# Patient Record
Sex: FEMALE | Race: WHITE | Employment: OTHER | URBAN - METROPOLITAN AREA
[De-identification: names, ages, dates, MRNs, and addresses within clinical notes are randomized per-mention and may not be internally consistent; named-entity substitution may affect disease eponyms.]

---

## 2019-06-04 NOTE — PATIENT DISCUSSION
Discussed condition and exacerbating conditions/situations (e.g., dry/arid environments, overhead fans, air conditioners, side effect of medications). Lot #: [id number] Lot #: [id number]

## 2020-11-19 ENCOUNTER — SURGERY/PROCEDURE (OUTPATIENT)
Dept: URBAN - METROPOLITAN AREA SURGERY 14 | Facility: SURGERY | Age: 75
End: 2020-11-19

## 2020-11-19 ENCOUNTER — YAG EVALUATION (OUTPATIENT)
Dept: URBAN - METROPOLITAN AREA CLINIC 39 | Facility: CLINIC | Age: 75
End: 2020-11-19

## 2020-11-19 DIAGNOSIS — H26.491: ICD-10-CM

## 2020-11-19 PROCEDURE — 92004 COMPRE OPH EXAM NEW PT 1/>: CPT

## 2020-11-19 PROCEDURE — 6682154 YAG CAPSULOTOMY(SX ONLY)

## 2020-11-19 ASSESSMENT — TONOMETRY: OD_IOP_MMHG: 16

## 2020-11-19 ASSESSMENT — VISUAL ACUITY
OD_CC: 20/50+2
OD_SC: 20/60

## 2023-08-08 ENCOUNTER — COMPREHENSIVE EXAM (OUTPATIENT)
Dept: URBAN - METROPOLITAN AREA CLINIC 37 | Facility: CLINIC | Age: 78
End: 2023-08-08

## 2023-08-08 DIAGNOSIS — H52.03: ICD-10-CM

## 2023-08-08 DIAGNOSIS — H43.393: ICD-10-CM

## 2023-08-08 DIAGNOSIS — H26.491: ICD-10-CM

## 2023-08-08 DIAGNOSIS — H35.30: ICD-10-CM

## 2023-08-08 PROCEDURE — 92014 COMPRE OPH EXAM EST PT 1/>: CPT

## 2023-08-08 PROCEDURE — 92250 FUNDUS PHOTOGRAPHY W/I&R: CPT

## 2023-08-08 PROCEDURE — 92015 DETERMINE REFRACTIVE STATE: CPT

## 2023-08-08 ASSESSMENT — VISUAL ACUITY
OU_CC: 20/30
OD_CC: 20/30
OS_CC: 20/400

## 2023-08-08 ASSESSMENT — TONOMETRY
OD_IOP_MMHG: 9
OS_IOP_MMHG: 8